# Patient Record
Sex: MALE | Race: OTHER | HISPANIC OR LATINO | URBAN - METROPOLITAN AREA
[De-identification: names, ages, dates, MRNs, and addresses within clinical notes are randomized per-mention and may not be internally consistent; named-entity substitution may affect disease eponyms.]

---

## 2023-11-06 ENCOUNTER — EMERGENCY (EMERGENCY)
Facility: HOSPITAL | Age: 28
LOS: 1 days | Discharge: HOME | End: 2023-11-06
Attending: EMERGENCY MEDICINE
Payer: MEDICAID

## 2023-11-06 VITALS
SYSTOLIC BLOOD PRESSURE: 137 MMHG | DIASTOLIC BLOOD PRESSURE: 78 MMHG | TEMPERATURE: 99 F | HEART RATE: 82 BPM | HEIGHT: 68 IN | RESPIRATION RATE: 17 BRPM | WEIGHT: 210.1 LBS | OXYGEN SATURATION: 99 %

## 2023-11-06 PROCEDURE — 99283 EMERGENCY DEPT VISIT LOW MDM: CPT

## 2023-11-06 RX ORDER — OXYCODONE AND ACETAMINOPHEN 5; 325 MG/1; MG/1
1 TABLET ORAL
Qty: 2 | Refills: 0
Start: 2023-11-06 | End: 2023-11-06

## 2023-11-06 NOTE — ED PROVIDER NOTE - NS ED ATTENDING STATEMENT MOD
This was a shared visit with the DALY. I reviewed and verified the documentation and independently performed the documented:

## 2023-11-06 NOTE — ED PROVIDER NOTE - CLINICAL SUMMARY MEDICAL DECISION MAKING FREE TEXT BOX
Patient with dental pain.  No evidence of abscess.  Patient states he is driving, will send 2 doses of percocet to pharmacy, dental clinic info provided to patient.  Patient to f/u with dental and agreeable, return to ER precautions provided.

## 2023-11-06 NOTE — ED PROVIDER NOTE - NSFOLLOWUPINSTRUCTIONS_ED_ALL_ED_FT
Dental Pain    Dental pain (toothache) may be caused by many things including tooth decay (cavities or caries), abscess or infection, or trauma. If you were prescribed an antibiotic medicine, finish all of it even if you start to feel better. Rinsing your mouth with salt water or applying ice to the painful area of your face may help with the pain. Follow up with a dentist is important in ensuring good oral health and preventing the worsening of dental disease.    SEEK IMMEDIATE MEDICAL CARE IF YOU HAVE ANY OF THE FOLLOWING SYMPTOMS: unable to open your mouth, trouble breathing or swallowing, fever, or swelling of the face, neck, or jaw.     1) Follow up with a dentist  2) Return to the ED immediately for new or worsening symptoms   3) Please continue to take any home medications as prescribed  4) Do not to drive, operate heavy machinery or make any important decisions while on pain medications    Dolor dental    El dolor dental (dolor de muelas) puede ser causado por muchas cosas, incluidas las caries (caries o caries), abscesos, infecciones o traumatismos. Si le recetaron un antibiótico, termínelo todo incluso si comienza a sentirse mejor. Enjuagarse la boca con agua salada o aplicar hielo en el área dolorida de la isaac puede ayudar con el dolor. El seguimiento con un dentista es importante para garantizar karsten buena jaye bucal y prevenir el empeoramiento de la enfermedad dental.    BUSQUE ATENCIÓN MÉDICA INMEDIATA SI TIENE ALGUNO DE LOS SIGUIENTES SÍNTOMAS: incapacidad para abrir la boca, dificultad para respirar o tragar, fiebre o hinchazón de la isaac, el samaria o la mandíbula.    1) Frances un seguimiento con un dentista  2) Regrese al servicio de urgencias inmediatamente si los síntomas aparecen o empeoran.  3) Continúe tomando los medicamentos caseros según lo recetado.  4) No conduzca, opere maquinaria pesada ni tome decisiones importantes mientras esté tomando analgésicos.

## 2023-11-06 NOTE — ED PROVIDER NOTE - OBJECTIVE STATEMENT
28-year-old male presents for lower front tooth ache.  Patient states that he has been having toothache for 9 days was started on antibiotics which she is continue to take as well as ibuprofen which he took 3 hours prior to arrival however continues to have pain.  Patient states he is unable to see a dentist.  Patient states pain is becoming worse he came to the ER for further evaluation.  Patient denies any fevers denies any swelling any drooling or other complaints.

## 2023-11-06 NOTE — ED ADULT NURSE NOTE - NSFALLUNIVINTERV_ED_ALL_ED
Bed/Stretcher in lowest position, wheels locked, appropriate side rails in place/Call bell, personal items and telephone in reach/Instruct patient to call for assistance before getting out of bed/chair/stretcher/Non-slip footwear applied when patient is off stretcher/[place] to call system/Physically safe environment - no spills, clutter or unnecessary equipment/Purposeful proactive rounding/Room/bathroom lighting operational, light cord in reach

## 2023-11-06 NOTE — ED PROVIDER NOTE - PHYSICAL EXAMINATION
GEN:   comfortable, in no apparent distress, AOx3  EYES:   PERRL, extra-occular movements intact  HEENT:   airway patent, moist mucosal membranes, uvula midline. No posterior pharynx erythema, no visible swelling or PTA, no exudates, no tongue elevation, normal phonation with no hoarse voice.  Dental carries without palpable abscess or swelling.  CV:  RRR, Pulses- Radial: 2+ bilateral and equal  RESP:   non-labored, speaking in full sentences  MSK:   no musculoskeletal tenderness, 5/5 strength, moving all extremities  SKIN:   dry, intact, no rash  NEURO:   AOx3, no focal weakness or loss of sensation, gait normal, GCS 15  PSYCH: calm, cooperative, no apparent risk to self and others

## 2023-11-06 NOTE — ED PROVIDER NOTE - ATTENDING APP SHARED VISIT CONTRIBUTION OF CARE
seen with acp  c/o persisitent right lower dental pain  has not seen a dentist  no signs of infection  will provide percocet for 2 days  agree with acps assessment hx and physical and disposition

## 2023-11-06 NOTE — ED ADULT NURSE NOTE - OBJECTIVE STATEMENT
pt  is a 29 y/o  male  with  c/o   toothache  pt  states  shes  been  having  a toothache  x  2  days.no  relief  from medications he is  taking.

## 2024-07-19 NOTE — ED PROVIDER NOTE - PATIENT PORTAL LINK FT
You can access the FollowMyHealth Patient Portal offered by [facility] by registering at the following website: [email or web address]. By joining [facility]’s FollowMyHealth portal, you will also be able to view your health information using other applications (apps) compatible with our system.
on the discharge service for the patient. I have reviewed and made amendments to the documentation where necessary.

## 2024-12-09 NOTE — ED ADULT NURSE NOTE - CHIEF COMPLAINT
What Type Of Note Output Would You Prefer (Optional)?: Standard Output
How Severe Is Your Skin Lesion?: moderate
Has Your Skin Lesion Been Treated?: not been treated
Is This A New Presentation, Or A Follow-Up?: Skin Lesions
The patient is a 28y Male complaining of toothache.